# Patient Record
Sex: FEMALE | Race: WHITE | NOT HISPANIC OR LATINO | URBAN - METROPOLITAN AREA
[De-identification: names, ages, dates, MRNs, and addresses within clinical notes are randomized per-mention and may not be internally consistent; named-entity substitution may affect disease eponyms.]

---

## 2017-05-22 ENCOUNTER — OFFICE VISIT - RIVER FALLS (OUTPATIENT)
Dept: FAMILY MEDICINE | Facility: CLINIC | Age: 47
End: 2017-05-22

## 2017-05-22 ENCOUNTER — COMMUNICATION - RIVER FALLS (OUTPATIENT)
Dept: FAMILY MEDICINE | Facility: CLINIC | Age: 47
End: 2017-05-22

## 2017-05-30 ENCOUNTER — AMBULATORY - RIVER FALLS (OUTPATIENT)
Dept: FAMILY MEDICINE | Facility: CLINIC | Age: 47
End: 2017-05-30

## 2022-02-12 VITALS — HEART RATE: 80 BPM | TEMPERATURE: 98.1 F | DIASTOLIC BLOOD PRESSURE: 92 MMHG | SYSTOLIC BLOOD PRESSURE: 136 MMHG

## 2022-02-15 NOTE — PROGRESS NOTES
Patient:   CHIDI AMEZQUITA            MRN: [id number]            FIN: [id number]               Age:   46 years     Sex:  Female     :  1970   Associated Diagnoses:   None   Author:   Siomara Sandoval      Chief Complaint   2017 6:13 PM CDT    Pt c/o L flank pain. Sx started last week which thought was stone/UTI. Saturday had treated with standing Macrobid rx.  felt better but then worsened. Also using cranberry. Using heating pad which gives no relief. Tizanadine also no relief.        History of Present Illness   PPC iwth  with left flank pain which started about 5-7d ago, thought it was a stone which she has a hx of (do not see documentation of this in chart), has macrobid standing order and used this but has not improved, using cranberry juice, muscle relaxants not beneficial  cholecsytectomy   CT scan of abd/pelvis revealed right sided kidney infection but no stones   CT scan of abd/pelvis normal  tells me she can feel herself passing stones about every month or two but has never collected one for analysis  has hx of lupus but has not seen rheumatology for years because her specialist retired  she is following with her PCP, Dr MARTINE Quinonez, and sees her every 2-3 months  denies any musculoskeletal trauma  no neurologic changes to lower extremities      Review of Systems   Constitutional:  Negative.    Eye:  Negative.    Ear/Nose/Mouth/Throat:  Negative.    Respiratory:  Negative.    Cardiovascular:  Negative.    Gastrointestinal:  Negative.    Genitourinary:  Dysuria.    Gynecologic:  Negative.    Hematology/Lymphatics:  Negative.    Endocrine:  Negative.    Musculoskeletal:  Negative except as documented in history of present illness.    Integumentary:  Negative.             Health Status   Allergies:    Allergic Reactions (Selected)  Severity Not Documented  Amoxicillin (Yeast infection)  Sulfa drug (Yeast infection.)  Nonallergic Reactions (Selected)  Severity Not  Documented  NSAID (No reactions were documented)  Tylenol (No reactions were documented)   Medications:  (Selected)   Prescriptions  Prescribed  FLUoxetine 20 mg oral capsule: See Instructions, Instructions: TAKE TWO CAPSULES BY MOUTH EVERY DAY, # 180 cap(s), Type: Soft Stop, Pharmacy: [facility] [id number], TAKE TWO CAPSULES BY MOUTH EVERY DAY  Lidoderm 5% topical film: 1 patch(es), TOP, Daily, Instructions: JANESSA, # 30 patch(es), 5 Refill(s), JANESSA, Type: Maintenance, Pharmacy: [facility] [id number], 1 patch(es) top daily,Instr:JANESSA  Macrobid 100 mg oral capsule: 1 cap(s) ( 100 mg ), PO, BID, # 14 cap(s), 0 Refill(s), Type: Maintenance, Pharmacy: [facility] [id number], 1 cap(s) po bid,x7 day(s)  One touch ultra test strips and a box of delica lancets: One touch ultra test strips and a box of delica lancets, See Instructions, Instructions: test 3x/ day, 90 day(s) supply, 3 Refill(s), Type: Maintenance, Pharmacy: [facility] [id number]  Synthroid 150 mcg (0.15 mg) oral tablet: 1 tab(s) ( 150 mcg ), PO, Daily, Instructions: Has to be Synthroid and not Levothyrozine., # 90 tab(s), 3 Refill(s), JANESSA, Type: Maintenance, Pharmacy: [facility] [id number], 1 tab(s) po daily,Instr:Has to be Synthroid and not Levothyrozine.  amitriptyline 75 mg oral tablet: 1 tab(s) ( 75 mg ), PO, Once a day (at bedtime), # 90 tab(s), 1 Refill(s), Type: Maintenance, Pharmacy: [facility] [id number], 1 tab(s) po hs,x90 day(s)  betamethasone-clotrimazole 0.05%-1% topical cream: 1 michael, TOP, BID, # 45 g, 2 Refill(s), Type: Maintenance, Pharmacy: [facility] [id number], 1 michael top bid  buPROPion 300 mg/24 hours (XL) oral tablet, extended release: See Instructions, Instructions: TAKE 1 TABLET BY MOUTH DAILY, # 90 tab(s), 1 Refill(s), Type: Maintenance, Pharmacy: [facility] [id number], TAKE 1 TABLET BY MOUTH DAILY  hydroCHLOROthiazide-triamterene 25 mg-37.5 mg oral tablet: 2 tab(s), po, daily, # 60 tab(s), 0 Refill(s),  Type: Maintenance, Pharmacy: [facility] [id number], Pt needs appt for additional refills  tiZANidine 4 mg oral tablet: 5 tab(s) ( 20 mg ), PO, daily, Instructions: OK to fill June 23 rd 2016, # 150 tab(s), 0 Refill(s), Type: Maintenance, 5 tab(s) po daily,x30 day(s),Instr:OK to fill June 23 rd 2016  Documented Medications  Documented  Calcium 600+D: PO, TID, 0  Daily Multiple Vitamins: 1 tab, po, daily, 0 Refill(s), Type: Maintenance  HYDROmorphone: ( 4 mg ), po, q4-6 hrs, 0 Refill(s), Type: Maintenance  clonazePAM 0.5 mg oral tablet: 1 tab(s) ( 0.5 mg ), po, tid, 0 Refill(s), Type: Maintenance  omega-3 polyunsaturated fatty acids oral capsule: 0 Refill(s), Type: Maintenance   Problem list:    All Problems (Selected)  Anxiety / ICD-9-.00 / Confirmed  Chronic pain / ICD-9-.2 / Confirmed  EDEMA / SNOMED CT 987993312 / Confirmed  Fibromyalgia / SNOMED CT 30029412 / Confirmed  Hypothyroid / SNOMED CT 950677955 / Confirmed  Hypothyroid / SNOMED CT 705463355 / Confirmed  IBS (Irritable Bowel Syndrome) / ICD-9-.1 / Confirmed  Lupus / SNOMED CT 748182335 / Confirmed  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  Obese / ICD-9-.00 / Probable  Post Partum Depression / ICD-9-.40 / Confirmed      Histories   Family History:    Hypertension  Father  Mother  Heart disease  Mother  Father  Osteoporosis  Father  Depression  Mother  Hyperlipidemia  Mother  Depression  Mother  Psychiatric  Mother  CAD - Coronary artery disease  Father  Mother        Physical Examination   Vital Signs   5/22/2017 6:13 PM CDT Temperature Tympanic 98.1 DegF    Peripheral Pulse Rate 80 bpm    Pulse Site Radial artery    HR Method Manual    Systolic Blood Pressure 136 mmHg    Diastolic Blood Pressure 92 mmHg  HI    Mean Arterial Pressure 107 mmHg    BP Site Right arm    BP Method Manual      General:  Alert and oriented, crying, in wheelchair.    Eye:  Pupils are equal, round and reactive to  light.    Gastrointestinal:  Soft, Non-tender, Non-distended.    Genitourinary:  No inguinal tenderness, left flank tenderness.    Lymphatics:  No lymphadenopathy neck, axilla, groin.    Integumentary:  Warm, Dry, Pink.    Neurologic:  Alert, Oriented, Normal sensory.    Psychiatric:  Cooperative, very teary eyed.    confirmed NSAIDs only cause GI upset: 60mg toradol given IM  after 25 minutes pt obtained some relief      Review / Management   Results review:  Lab results   5/22/2017 6:43 PM CDT Sodium Level 135 mmol/L    Potassium Level 3.9 mmol/L    Chloride Level 94 mmol/L    CO2 Level 30 mmol/L    AGAP 11    Glucose Level 75 mg/dL    BUN 17 mg/dL    Creatinine Level 0.83 mg/dL    BUN/Creat Ratio 20    eGFR  >60    eGFR Non-African American >60    Calcium Level 9.5 mg/dL    UA Color Yellow    UA Clarity Clear    UA pH 6.0    UA Specific Gravity <=1.005    UA Glucose Negative mg/dL    UA Bilirubin Negative    UA Ketones Negative mg/dL    Urine Occult Blood Negative    UA Protein Negative mg/dL    UA Nitrite Negative    UA Leukocyte Esterase Negative    UA Urobilinogen Normal   .    Radiology results   Reviewed radiologist's report, no acute findings in the abdomen or pelvis, no urinary stones  lagre volume stool throughout the colon      Impression and Plan   Patient Instructions:       Counseled: Patient, Family, Regarding diagnosis, Regarding treatment, Regarding medications, Regarding diet, Regarding activity, Verbalized understanding.    Summary:  does not feel tizanidine has been beneficial for this pain, I am going to try flexeril but I have cautioned her not to use the muscle relaxants simultaneously, she will need to stop tizanidine  miralax and sennokot for stool movement  asked her if she needed hospitalization for pain control but she declined  if pain is worsening tonight please go to ED  she has scheduled appt with Dr Quinonez on Wednesday  I have made a referral to rheumatology as  she needs to establish care with a specialist for her lupus diagnosis.    Orders     Orders (Selected)   Prescriptions  Prescribed  Flexeril 10 mg oral tablet: 1 tab(s) ( 10 mg ), PO, TID, PRN: for spasm, # 30 tab(s), 0 Refill(s), Type: Maintenance, Pharmacy: [facility] [id number], 1 tab(s) po tid,PRN:for spasm.

## 2025-07-03 DIAGNOSIS — G89.4 CHRONIC PAIN DISORDER: Primary | ICD-10-CM

## 2025-07-07 ENCOUNTER — TELEPHONE (OUTPATIENT)
Dept: PAIN MANAGEMENT | Age: 55
End: 2025-07-07

## 2025-07-10 ENCOUNTER — APPOINTMENT (OUTPATIENT)
Dept: PAIN MANAGEMENT | Age: 55
End: 2025-07-10

## 2025-08-07 ENCOUNTER — APPOINTMENT (OUTPATIENT)
Dept: PAIN MANAGEMENT | Age: 55
End: 2025-08-07